# Patient Record
Sex: FEMALE | Race: BLACK OR AFRICAN AMERICAN | NOT HISPANIC OR LATINO | Employment: OTHER | URBAN - METROPOLITAN AREA
[De-identification: names, ages, dates, MRNs, and addresses within clinical notes are randomized per-mention and may not be internally consistent; named-entity substitution may affect disease eponyms.]

---

## 2018-03-28 LAB — BCS RECOMMENDATION EXT: NORMAL

## 2020-10-22 PROBLEM — Z85.3 HISTORY OF BREAST CANCER: Status: ACTIVE | Noted: 2020-10-22

## 2021-03-30 ENCOUNTER — PATIENT OUTREACH (OUTPATIENT)
Dept: ADMINISTRATIVE | Facility: HOSPITAL | Age: 61
End: 2021-03-30

## 2021-09-17 PROBLEM — M25.561 CHRONIC PAIN OF RIGHT KNEE: Status: ACTIVE | Noted: 2021-09-17

## 2021-09-17 PROBLEM — G89.29 CHRONIC PAIN OF RIGHT KNEE: Status: ACTIVE | Noted: 2021-09-17

## 2022-01-06 ENCOUNTER — PATIENT OUTREACH (OUTPATIENT)
Dept: ADMINISTRATIVE | Facility: HOSPITAL | Age: 62
End: 2022-01-06
Payer: MEDICAID

## 2022-10-24 ENCOUNTER — PATIENT OUTREACH (OUTPATIENT)
Dept: ADMINISTRATIVE | Facility: HOSPITAL | Age: 62
End: 2022-10-24
Payer: MEDICAID

## 2025-01-21 PROBLEM — I50.9 NEW ONSET OF CONGESTIVE HEART FAILURE: Status: ACTIVE | Noted: 2025-01-21

## 2025-01-21 PROBLEM — E87.1 HYPONATREMIA: Status: ACTIVE | Noted: 2025-01-21

## 2025-01-21 PROBLEM — I48.92 ATRIAL FLUTTER: Status: ACTIVE | Noted: 2025-01-21

## 2025-01-21 PROBLEM — R79.89 ELEVATED BRAIN NATRIURETIC PEPTIDE (BNP) LEVEL: Status: ACTIVE | Noted: 2025-01-21

## 2025-01-22 PROBLEM — I42.8 NON-ISCHEMIC CARDIOMYOPATHY: Status: ACTIVE | Noted: 2025-01-22

## 2025-01-23 PROBLEM — I50.20 HEART FAILURE WITH REDUCED EJECTION FRACTION (HFREF, <= 40%): Status: ACTIVE | Noted: 2025-01-23

## 2025-03-19 ENCOUNTER — PATIENT OUTREACH (OUTPATIENT)
Dept: ADMINISTRATIVE | Facility: HOSPITAL | Age: 65
End: 2025-03-19
Payer: MEDICAID

## 2025-05-28 PROBLEM — I50.23 ACUTE ON CHRONIC HEART FAILURE WITH REDUCED EJECTION FRACTION (HFREF, <= 40%): Status: ACTIVE | Noted: 2025-05-28

## 2025-05-28 PROBLEM — I50.20 HFREF (HEART FAILURE WITH REDUCED EJECTION FRACTION): Status: RESOLVED | Noted: 2025-01-23 | Resolved: 2025-05-28

## 2025-05-28 PROBLEM — I50.23 ACUTE ON CHRONIC HEART FAILURE WITH REDUCED EJECTION FRACTION (HFREF, <= 40%): Status: RESOLVED | Noted: 2025-05-28 | Resolved: 2025-05-28

## 2025-05-28 PROBLEM — R11.2 NAUSEA AND VOMITING: Status: ACTIVE | Noted: 2025-05-28

## 2025-05-28 PROBLEM — R57.9 SHOCK: Status: ACTIVE | Noted: 2025-05-28

## 2025-05-28 PROBLEM — Z91.89: Status: ACTIVE | Noted: 2025-05-28

## 2025-05-28 PROBLEM — I50.21 ACUTE HEART FAILURE WITH REDUCED EJECTION FRACTION (HFREF, <= 40%): Status: ACTIVE | Noted: 2025-01-21

## 2025-05-30 PROBLEM — I48.91 FLUTTER-FIBRILLATION: Status: ACTIVE | Noted: 2025-05-30

## 2025-05-30 PROBLEM — I48.92 FLUTTER-FIBRILLATION: Status: ACTIVE | Noted: 2025-05-30

## 2025-05-31 PROBLEM — R00.0 TACHYARRHYTHMIA: Status: ACTIVE | Noted: 2025-05-31

## 2025-07-08 ENCOUNTER — PATIENT OUTREACH (OUTPATIENT)
Dept: ADMINISTRATIVE | Facility: HOSPITAL | Age: 65
End: 2025-07-08
Payer: MEDICAID

## 2025-07-08 NOTE — PROGRESS NOTES
7-7-25- please address open care gap cervical and mammogram screening noted on 7-8-25 appt notes

## 2025-08-05 ENCOUNTER — PATIENT OUTREACH (OUTPATIENT)
Dept: ADMINISTRATIVE | Facility: HOSPITAL | Age: 65
End: 2025-08-05
Payer: MEDICAID

## 2025-08-05 NOTE — PROGRESS NOTES
8-4-25 please address open care gap mammogram and cervical cancer screening, please offer self swab during visit, noted on 8-5-25 appt notes. If screening completed at outside facility, please get sign MANISHA, to request records.